# Patient Record
Sex: MALE | Race: WHITE | NOT HISPANIC OR LATINO
[De-identification: names, ages, dates, MRNs, and addresses within clinical notes are randomized per-mention and may not be internally consistent; named-entity substitution may affect disease eponyms.]

---

## 2018-04-30 ENCOUNTER — TRANSCRIPTION ENCOUNTER (OUTPATIENT)
Age: 23
End: 2018-04-30

## 2018-12-21 ENCOUNTER — TRANSCRIPTION ENCOUNTER (OUTPATIENT)
Age: 23
End: 2018-12-21

## 2019-09-20 ENCOUNTER — EMERGENCY (EMERGENCY)
Facility: HOSPITAL | Age: 24
LOS: 1 days | Discharge: HOME | End: 2019-09-20
Attending: EMERGENCY MEDICINE
Payer: COMMERCIAL

## 2019-09-20 VITALS
HEART RATE: 73 BPM | OXYGEN SATURATION: 99 % | SYSTOLIC BLOOD PRESSURE: 104 MMHG | RESPIRATION RATE: 16 BRPM | DIASTOLIC BLOOD PRESSURE: 65 MMHG

## 2019-09-20 VITALS
OXYGEN SATURATION: 97 % | RESPIRATION RATE: 18 BRPM | WEIGHT: 300.05 LBS | HEIGHT: 73 IN | HEART RATE: 93 BPM | DIASTOLIC BLOOD PRESSURE: 93 MMHG | TEMPERATURE: 98 F | SYSTOLIC BLOOD PRESSURE: 135 MMHG

## 2019-09-20 DIAGNOSIS — Z98.818 OTHER DENTAL PROCEDURE STATUS: Chronic | ICD-10-CM

## 2019-09-20 DIAGNOSIS — Z98.890 OTHER SPECIFIED POSTPROCEDURAL STATES: Chronic | ICD-10-CM

## 2019-09-20 DIAGNOSIS — R09.89 OTHER SPECIFIED SYMPTOMS AND SIGNS INVOLVING THE CIRCULATORY AND RESPIRATORY SYSTEMS: ICD-10-CM

## 2019-09-20 PROCEDURE — 31505 DIAGNOSTIC LARYNGOSCOPY: CPT

## 2019-09-20 PROCEDURE — 99283 EMERGENCY DEPT VISIT LOW MDM: CPT | Mod: 25

## 2019-09-20 PROCEDURE — 70360 X-RAY EXAM OF NECK: CPT | Mod: 26

## 2019-09-20 PROCEDURE — 31575 DIAGNOSTIC LARYNGOSCOPY: CPT

## 2019-09-20 PROCEDURE — 99283 EMERGENCY DEPT VISIT LOW MDM: CPT

## 2019-09-20 PROCEDURE — 70360 X-RAY EXAM OF NECK: CPT

## 2019-09-20 PROCEDURE — 99285 EMERGENCY DEPT VISIT HI MDM: CPT | Mod: 25

## 2019-09-20 RX ORDER — FLUOXETINE HCL 10 MG
0 CAPSULE ORAL
Qty: 0 | Refills: 0 | DISCHARGE

## 2019-09-20 RX ORDER — PANTOPRAZOLE SODIUM 20 MG/1
1 TABLET, DELAYED RELEASE ORAL
Qty: 7 | Refills: 0
Start: 2019-09-20 | End: 2019-09-26

## 2019-09-20 RX ORDER — IBUPROFEN 200 MG
600 TABLET ORAL ONCE
Refills: 0 | Status: COMPLETED | OUTPATIENT
Start: 2019-09-20 | End: 2019-09-20

## 2019-09-20 RX ORDER — PANTOPRAZOLE SODIUM 20 MG/1
40 TABLET, DELAYED RELEASE ORAL ONCE
Refills: 0 | Status: DISCONTINUED | OUTPATIENT
Start: 2019-09-20 | End: 2019-09-20

## 2019-09-20 RX ORDER — PANTOPRAZOLE SODIUM 20 MG/1
40 TABLET, DELAYED RELEASE ORAL ONCE
Refills: 0 | Status: COMPLETED | OUTPATIENT
Start: 2019-09-20 | End: 2019-09-20

## 2019-09-20 RX ADMIN — Medication 5 MILLIGRAM(S): at 09:08

## 2019-09-20 RX ADMIN — Medication 600 MILLIGRAM(S): at 10:32

## 2019-09-20 RX ADMIN — PANTOPRAZOLE SODIUM 40 MILLIGRAM(S): 20 TABLET, DELAYED RELEASE ORAL at 10:33

## 2019-09-20 RX ADMIN — Medication 600 MILLIGRAM(S): at 09:09

## 2019-09-20 NOTE — ED PROVIDER NOTE - NSFOLLOWUPCLINICS_GEN_ALL_ED_FT
[facility] - Primary Care  Primary Care  [address] [place] [address]  Phone: [phone]  Fax:   Follow Up Time: [facility] - Primary Care  Primary Care  [address] [place] [address]  Phone: [phone]  Fax:   Follow Up Time:

## 2019-09-20 NOTE — ED ADULT NURSE NOTE - OBJECTIVE STATEMENT
23 yo pt presents with feeling that something is stuck in his throat, believes it to be a wrapper of a protein bar that he ate at work at 0300. one episode of vomiting at 0700, "after chugging a liter of water.", but states no difficulty swallowing. pt speaking in full sentences with no difficulty. Denies difficulty swallowing, drooling, SOB, pain in throat. O2 sat 100% on RA. Reporting slight throat discomfort but no pain at this time. denies current nausea.

## 2019-09-20 NOTE — ED PROVIDER NOTE - ATTENDING CONTRIBUTION TO CARE
19 y/o m with pmhx anxiety, surg lasjarrell presents for feeling throat pain after eating a protein bar. believes part of the wrapper in lodged in his throat and hurts when he swallows. no bleeding. no fever or chills. able to drink water. occurred at around 3 am. no shortness of breath or difficulty breathing. no other complaints.   Gen.  no acute distress  HEENT:  perrl eomi, post pharynx no erythema / edema. no FB noted. patent airway. no trismus or drooling.   Lungs:  b/l bs  CVS: S1S2   Abd;  soft non tender  Ext: no pitting edema  Neuro: aaox3, no focal deficits  MSK:  strength 5/5 b/l upper and lower ext.

## 2019-09-20 NOTE — CONSULT NOTE ADULT - ASSESSMENT
25yo male presents to the ER C/O foreign body sensation since early this AM. Bedside indirect laryngoscopy was normal except for B/L tonsillar hypertrophy. No FB visualized

## 2019-09-20 NOTE — ED PROVIDER NOTE - NS ED ROS FT
Gen: Denies fever, chills  HEENT: Admits foreign body sensation throat, denies throat pain, difficulty swallowing  CV: Denies chest pain, palpitations  Resp: Denies SOB, cough  GI: Denies constipation, Admits nausea, vomiting  : Denies dysuria, frequency   Msk: Denies back pain, extremity pain  Neuro: Denies LOC, weakness, seizures

## 2019-09-20 NOTE — ED PROVIDER NOTE - PATIENT PORTAL LINK FT
You can access the FollowMyHealth Patient Portal offered by [facility] by registering at the following website: [email or web address]. By joining [employer]’s FollowMyHealth portal, you will also be able to view your health information using other applications (apps) compatible with our system.

## 2019-09-20 NOTE — CONSULT NOTE ADULT - SUBJECTIVE AND OBJECTIVE BOX
CC: Foreign body sensation    HPI: 23 y/o male [employer] presents with foreign body sensation in his throat 6 hours PTA. Pt suspects he swallowed part of a protein bar wrapper on accident. Reports he has been intentionally coughing since onset trying to get it out, can feel it move slightly up and down. Also reports drinking a lot of water since incident which he vomited after forcefully coughing. Denies difficulty breathing, change in voice, trouble swallowing, throat pain, SOB, or other complaints. Has not tried other PO since.        PAST MEDICAL & SURGICAL HISTORY:  Anxiety  S/P wisdom tooth extraction  S/P LASIK surgery    Allergies    No Known Allergies    Intolerances      MEDICATIONS  (STANDING):    MEDICATIONS  (PRN):      Social History: no tobacco use    Family history: no pertinent FHx    ROS:   ENT: all negative except as noted in HPI   CV: denies palpitations  Pulm: denies SOB, cough, hemoptysis  GI: denies change in apetite, indigestion, n/v  : denies pertinent urinary symptoms, urgency  Neuro: denies numbness/tingling, loss of sensation  Psych: denies anxiety  MS: denies muscle weakness, instability  Heme: denies easy bruising or bleeding  Endo: denies heat/cold intolerance, excessive sweating  Vascular: denies LE edema    Vital Signs Last 24 Hrs  T(C): 36.9 (20 Sep 2019 08:07), Max: 36.9 (20 Sep 2019 08:07)  T(F): 98.5 (20 Sep 2019 08:07), Max: 98.5 (20 Sep 2019 08:07)  HR: 73 (20 Sep 2019 10:34) (73 - 93)  BP: 104/65 (20 Sep 2019 10:34) (104/65 - 135/93)  BP(mean): --  RR: 16 (20 Sep 2019 10:34) (16 - 18)  SpO2: 99% (20 Sep 2019 10:34) (97% - 99%)              PHYSICAL EXAM:  Gen: NAD  Skin: No rashes, bruises, or lesions  Head: Normocephalic, Atraumatic  Face: no edema, erythema, or fluctuance. Parotid glands soft without mass  Eyes: no scleral injection  Nose: Nares bilaterally patent, no discharge  Mouth: Pt with strong gag reflex, exam limited, B/L tonsillar enlargement, No Stridor / Drooling / Trismus.  Mucosa moist, tongue/uvula midline, oropharynx clear  Neck: Flat, supple, no lymphadenopathy, trachea midline, no masses  Lymphatic: No lymphadenopathy  Resp: breathing easily, no stridor  CV: no peripheral edema/cyanosis  GI: nondistended   Peripheral vascular: no JVD or edema  Neuro: facial nerve intact, no facial droop            Fiberoptic Indirect laryngoscopy:  (Scope #2 used)    Reason for Laryngoscopy:    Patient was unable to cooperate with mirror.  No foreign body noted, Nasopharynx, oropharynx, and hypopharynx clear, no bleeding. Tongue base, posterior pharyngeal wall, vallecula, epiglottis, and subglottis appear normal. No erythema, edema, pooling of secretions, masses or lesions. Airway patent, no foreign body visualized. No glottic/supraglottic edema. True vocal cords, arytenoids, vestibular folds, ventricles, pyriform sinuses, and aryepiglottic folds appear normal bilaterally. Vocal cords mobile with good contact b/l.

## 2019-09-20 NOTE — ED ADULT TRIAGE NOTE - CHIEF COMPLAINT QUOTE
"feel like something is stuck in my throat" think he swallowed part of a wrapper.  No shortness of breath.

## 2019-09-20 NOTE — ED PROVIDER NOTE - PROGRESS NOTE DETAILS
Spoke to ENT - agreed to come see pt in the ED Endoscopy with ENT in the ED - nonrevealing for any foreign body or strictures in the upper airway or upper esophagus. Recommend discharge home, f/u PMD, steroids and protonix. Discussed with patient - also agreeable with plan. Pt states he feels better after scope - sensation is persistent, but describes better "cant feel it move"

## 2019-09-20 NOTE — ED PROVIDER NOTE - NSFOLLOWUPINSTRUCTIONS_ED_ALL_ED_FT
Take Protonix 40mg once per day for 1 week, starting tomorrow.     Thank you for visiting our Emergency Department, it has been a pleasure taking part in your healthcare. Please follow up with your primary doctor within x48 hours.    Your discharge diagnosis is: Foreign body sensation of throat     You have had a detailed discussion with your provider regarding your diagnosis, management and discharge plan. You have been given the opportunity to have your questions answered. At this time you have been deemed stable and fit for discharge.    Return precautions to the Emergency Department include but are not limited to: difficulty with speech or swallowing, change in voice, inability to tolerate food or liquids, unrelenting nausea, vomiting, fever, chills, chest pain, shortness of breath, dizziness, abdominal pain, worsening pain, syncope, blood in urine or stool, headache that doesn't resolve, numbness or tingling, loss of sensation, loss of motor function, or any other concerning symptoms. Take Protonix 40mg once per day for 1 week, starting tomorrow.     Thank you for visiting our Emergency Department, it has been a pleasure taking part in your healthcare. Please follow up with your primary doctor within x48 hours.    Also follow up with Ear, Nose and Throat (ENT) in 1 week for recheck. Referral below.     Your discharge diagnosis is: Foreign body sensation of throat     You have had a detailed discussion with your provider regarding your diagnosis, management and discharge plan. You have been given the opportunity to have your questions answered. At this time you have been deemed stable and fit for discharge.    Return precautions to the Emergency Department include but are not limited to: difficulty with speech or swallowing, change in voice, inability to tolerate food or liquids, unrelenting nausea, vomiting, fever, chills, chest pain, shortness of breath, dizziness, abdominal pain, worsening pain, syncope, blood in urine or stool, headache that doesn't resolve, numbness or tingling, loss of sensation, loss of motor function, or any other concerning symptoms.

## 2019-09-20 NOTE — ED PROVIDER NOTE - CARE PROVIDER_API CALL
Sintia Yi)  Otolaryngology  [address] [place] [address], [address]  [place] [address]  Phone: [phone]  Fax: [phone]  Follow Up Time:

## 2019-09-20 NOTE — ED PROVIDER NOTE - CLINICAL SUMMARY MEDICAL DECISION MAKING FREE TEXT BOX
23 y/o M presents with foreign body sensation in throat for 6 hours with associated cough and 1 episode induced vomiting PTA. Pt denies difficulty with swallowing or speech. DDx foreign body, esophageal stricture, schatzki rings, achalasia (less likely). Plan motrin, steroids, ENT consult. 23 y/o M presents with foreign body sensation in throat for 6 hours with associated cough and 1 episode induced vomiting PTA. Pt denies difficulty with swallowing or speech. DDx foreign body, esophageal stricture, schatzki rings, achalasia (less likely). Plan motrin, steroids, ENT consult.  ATTG: : throat pain after possibly swallowing fb, check xray, ENT eval. pain medication.

## 2019-09-20 NOTE — ED PROVIDER NOTE - OBJECTIVE STATEMENT
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 Pt suspects he swallowed part of a protein bar wrapper on accident. Reports he has been intentionally coughing since onset trying to get it out, can feel it move slightly up and down. Also reports drinking a lot of water since incident which he vomited after forcefully coughing. Denies spontaneous vomiting. Has not tried other PO since. Denies difficulty breathing, trouble swallowing, throat pain, HA, CP, SOB, or other complaints. 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 Pt suspects he swallowed part of a protein bar wrapper on accident. Reports he has been intentionally coughing since onset trying to get it out, can feel it move slightly up and down. Also reports drinking a lot of water since incident which he vomited after forcefully coughing. Denies difficulty breathing, change in voice, trouble swallowing, throat pain, HA, CP, SOB, or other complaints. Has not tried other PO since.

## 2019-09-20 NOTE — ED PROVIDER NOTE - PHYSICAL EXAMINATION
Gen: WDWN young male, NAD  HEENT: EOMI, no nasal discharge, mucous membranes moist. Cannot visual posterior oropharynx due to obstruction from tongue. No oropharyngeal edema. No foreign body visualied   CV: RRR, +S1/S2, no M/R/G  Resp: CTAB, no W/R/R  GI: Abdomen soft non-distended, NTTP, no masses  MSK: No open wounds, no bruising, no LE edema  Neuro: A&Ox4, following commands, moving all four extremities spontaneously  Psych: appropriate mood

## 2019-11-07 ENCOUNTER — TRANSCRIPTION ENCOUNTER (OUTPATIENT)
Age: 24
End: 2019-11-07

## 2020-03-17 ENCOUNTER — TRANSCRIPTION ENCOUNTER (OUTPATIENT)
Age: 25
End: 2020-03-17

## 2020-04-03 ENCOUNTER — APPOINTMENT (OUTPATIENT)
Dept: DISASTER EMERGENCY | Facility: CLINIC | Age: 25
End: 2020-04-03

## 2020-04-03 PROBLEM — F41.9 ANXIETY DISORDER, UNSPECIFIED: Chronic | Status: ACTIVE | Noted: 2019-09-20

## 2020-04-03 PROBLEM — Z00.00 ENCOUNTER FOR PREVENTIVE HEALTH EXAMINATION: Status: ACTIVE | Noted: 2020-04-03

## 2020-04-13 ENCOUNTER — TRANSCRIPTION ENCOUNTER (OUTPATIENT)
Age: 25
End: 2020-04-13

## 2020-04-14 ENCOUNTER — TRANSCRIPTION ENCOUNTER (OUTPATIENT)
Age: 25
End: 2020-04-14

## 2020-04-25 ENCOUNTER — MESSAGE (OUTPATIENT)
Age: 25
End: 2020-04-25

## 2020-04-30 ENCOUNTER — APPOINTMENT (OUTPATIENT)
Dept: DISASTER EMERGENCY | Facility: CLINIC | Age: 25
End: 2020-04-30

## 2020-05-18 LAB
SARS-COV-2 IGG SERPL IA-ACNC: 6.2 INDEX
SARS-COV-2 IGG SERPL QL IA: POSITIVE

## 2020-10-17 ENCOUNTER — TRANSCRIPTION ENCOUNTER (OUTPATIENT)
Age: 25
End: 2020-10-17
